# Patient Record
Sex: MALE | Race: WHITE
[De-identification: names, ages, dates, MRNs, and addresses within clinical notes are randomized per-mention and may not be internally consistent; named-entity substitution may affect disease eponyms.]

---

## 2019-12-28 ENCOUNTER — HOSPITAL ENCOUNTER (EMERGENCY)
Dept: HOSPITAL 54 - ER | Age: 34
Discharge: HOME | End: 2019-12-28
Payer: MEDICARE

## 2019-12-28 VITALS — DIASTOLIC BLOOD PRESSURE: 93 MMHG | SYSTOLIC BLOOD PRESSURE: 135 MMHG

## 2019-12-28 VITALS — WEIGHT: 185 LBS | BODY MASS INDEX: 23.74 KG/M2 | HEIGHT: 74 IN

## 2019-12-28 DIAGNOSIS — Z91.048: ICD-10-CM

## 2019-12-28 DIAGNOSIS — Z60.2: ICD-10-CM

## 2019-12-28 DIAGNOSIS — R42: Primary | ICD-10-CM

## 2019-12-28 DIAGNOSIS — F41.9: ICD-10-CM

## 2019-12-28 DIAGNOSIS — F32.9: ICD-10-CM

## 2019-12-28 SDOH — SOCIAL STABILITY - SOCIAL INSECURITY: PROBLEMS RELATED TO LIVING ALONE: Z60.2

## 2019-12-28 NOTE — NUR
PT BIB SELF C/O DIZZINESS FOR 3 WEEKS, BLURRING OF VISION. PT AAOX4, VSS, 
BREATHING EVEN AND UNLABOORED ON ROOM AIR W/ NAD NOTED. PT CONNECTED TO THE 
MONITOR AND POX.
Patient discharged to home in stable condition. Written and verbal after care 
instructions given. Patient verbalizes understanding of instruction.
no